# Patient Record
Sex: MALE | Race: WHITE | URBAN - METROPOLITAN AREA
[De-identification: names, ages, dates, MRNs, and addresses within clinical notes are randomized per-mention and may not be internally consistent; named-entity substitution may affect disease eponyms.]

---

## 2017-02-21 ENCOUNTER — PATIENT OUTREACH (OUTPATIENT)
Dept: CASE MANAGEMENT | Age: 82
End: 2017-02-21

## 2017-02-21 PROBLEM — R25.1 TREMOR: Status: ACTIVE | Noted: 2017-02-21

## 2017-02-21 PROBLEM — R29.3 ABNORMAL POSTURE: Status: ACTIVE | Noted: 2017-02-21

## 2017-02-21 PROBLEM — R26.89 MULTIFACTORIAL GAIT DISORDER: Status: ACTIVE | Noted: 2017-02-21

## 2017-02-21 PROBLEM — G20 PARKINSON DISEASE (HCC): Status: ACTIVE | Noted: 2017-02-21

## 2017-02-21 PROBLEM — G90.3 NEUROLOGIC ORTHOSTATIC HYPOTENSION (HCC): Status: ACTIVE | Noted: 2017-02-21

## 2017-02-21 NOTE — PROGRESS NOTES
Patient currently lives in [place] with spouse - has ongoing [facility] services through [facility] - no current services needed or two week f/u to be done per neurologist.

## 2017-03-08 ENCOUNTER — PATIENT OUTREACH (OUTPATIENT)
Dept: CASE MANAGEMENT | Age: 82
End: 2017-03-08

## 2017-03-08 NOTE — PROGRESS NOTES
Patient not contacted by this  CM for two week f/u phone call as he has already had active [facility] services (including therapies) through [facility] when seen in Movement Disorder clinic - patient resides in [place], [facility].